# Patient Record
Sex: FEMALE | Race: WHITE | NOT HISPANIC OR LATINO | URBAN - METROPOLITAN AREA
[De-identification: names, ages, dates, MRNs, and addresses within clinical notes are randomized per-mention and may not be internally consistent; named-entity substitution may affect disease eponyms.]

---

## 2020-07-25 ENCOUNTER — TELEPHONE ENCOUNTER (OUTPATIENT)
Dept: URBAN - METROPOLITAN AREA CLINIC 13 | Facility: CLINIC | Age: 80
End: 2020-07-25

## 2020-07-25 RX ORDER — UBIDECARENONE 100 MG
TAKE 1 CAPSULE DAILY CAPSULE ORAL
Refills: 0 | OUTPATIENT
End: 2016-12-06

## 2020-07-25 RX ORDER — ALBUTEROL SULFATE 90 UG/1
INHALE 1 TO 2 PUFFS EVERY 4 TO 6 HOURS AS NEEDED AEROSOL, METERED RESPIRATORY (INHALATION)
Refills: 0 | OUTPATIENT
End: 2016-12-06

## 2020-07-25 RX ORDER — GLIPIZIDE 5 MG/1
TAKE 1 TABLET DAILY TABLET, EXTENDED RELEASE ORAL
Refills: 0 | OUTPATIENT
End: 2020-07-27

## 2020-07-25 RX ORDER — MELATONIN 2.5MG/10ML
TAKE AS DIRECTED LIQUID (ML) ORAL
Refills: 0 | OUTPATIENT
End: 2016-08-18

## 2020-07-25 RX ORDER — B-COMPLEX WITH VITAMIN C
TAKE 1 TABLET DAILY DOSAGE UNKNOWN PER PT TABLET ORAL
Refills: 0 | OUTPATIENT
End: 2020-07-27

## 2020-07-25 RX ORDER — METHYLPREDNISOLONE 4 MG/1
USE AS DIRECTED TABLET ORAL
Refills: 0 | OUTPATIENT
End: 2013-12-05

## 2020-07-25 RX ORDER — TOLTERODINE TARTRATE 4 MG/1
TAKE 1 CAPSULE DAILY CAPSULE, EXTENDED RELEASE ORAL
Refills: 0 | OUTPATIENT
End: 2016-08-18

## 2020-07-25 RX ORDER — AMLODIPINE BESYLATE 5 MG/1
TAKE 1 TABLET DAILY TABLET ORAL
Refills: 0 | OUTPATIENT
End: 2020-07-27

## 2020-07-25 RX ORDER — CHROMIUM 200 MCG
TAKE 1 TABLET DAILY AS DIRECTED TABLET ORAL
Refills: 0 | OUTPATIENT
End: 2016-08-18

## 2020-07-25 RX ORDER — DEXTRIN 3 G/3.5 G
TAKE AS DIRECTED POWDER (GRAM) ORAL
Refills: 0 | OUTPATIENT
End: 2016-08-18

## 2020-07-25 RX ORDER — FESOTERODINE FUMARATE 4 MG/1
TAKE 1 TABLET DAILY TABLET, FILM COATED, EXTENDED RELEASE ORAL
Refills: 0 | OUTPATIENT
End: 2013-12-05

## 2020-07-25 RX ORDER — MULTIVIT-MIN/FA/LYCOPEN/LUTEIN .4-300-25
TAKE 2 TABLET DAILY TABLET ORAL
Refills: 0 | OUTPATIENT
End: 2020-07-27

## 2020-07-25 RX ORDER — B-COMPLEX WITH VITAMIN C
TAKE 1 TABLET DAILY PT STATES UNKNOWN DOSE TABLET ORAL
Refills: 0 | OUTPATIENT
End: 2013-01-17

## 2020-07-25 RX ORDER — MELOXICAM 15 MG/1
TAKE 1 TABLET DAILY TABLET ORAL
Refills: 0 | OUTPATIENT
Start: 2006-12-05 | End: 2009-06-22

## 2020-07-25 RX ORDER — METRONIDAZOLE 250 MG/1
TAKE 1 TABLET 3 TIMES DAILY FOR 10 DAYS TABLET ORAL
Qty: 30 | Refills: 0 | OUTPATIENT
Start: 2012-10-30 | End: 2012-11-29

## 2020-07-25 RX ORDER — BUDESONIDE AND FORMOTEROL FUMARATE DIHYDRATE 160; 4.5 UG/1; UG/1
USE AS DIRECTED AEROSOL RESPIRATORY (INHALATION)
Refills: 0 | OUTPATIENT
End: 2016-12-06

## 2020-07-25 RX ORDER — BIFIDOBACTERIUM LONGUM 10MM CELL
TAKE 1 CAPSULE DAILY CAPSULE ORAL
Refills: 0 | OUTPATIENT
End: 2016-08-18

## 2020-07-25 RX ORDER — HYOSCYAMINE SULFATE 0.12 MG/1
TAKE 1 TABLET SL Q6 HRS PRN ABDOMINAL PAIN TABLET, ORALLY DISINTEGRATING ORAL
Qty: 60 | Refills: 2 | OUTPATIENT
Start: 2016-12-06 | End: 2017-06-06

## 2020-07-25 RX ORDER — POLYETHYLENE GLYCOL 3350, SODIUM CHLORIDE, SODIUM BICARBONATE AND POTASSIUM CHLORIDE WITH LEMON FLAVOR 420; 11.2; 5.72; 1.48 G/4L; G/4L; G/4L; G/4L
USE AS DIRECTED POWDER, FOR SOLUTION ORAL
Qty: 1 | Refills: 0 | OUTPATIENT
Start: 2012-09-11 | End: 2012-10-24

## 2020-07-25 RX ORDER — FESOTERODINE FUMARATE 8 MG/1
TAKE 1 TABLET DAILY TABLET, FILM COATED, EXTENDED RELEASE ORAL
Refills: 0 | OUTPATIENT
End: 2017-10-24

## 2020-07-25 RX ORDER — BIOTIN 2500 MCG
TAKE 1 CAPSULE DAILY 1,000 MG CAPSULE ORAL
Refills: 0 | OUTPATIENT
End: 2020-07-27

## 2020-07-25 RX ORDER — TOLTERODINE TARTRATE 2 MG/1
TAKE 2 TABLET DAILY TABLET, FILM COATED ORAL
Refills: 0 | OUTPATIENT
End: 2018-05-25

## 2020-07-26 ENCOUNTER — TELEPHONE ENCOUNTER (OUTPATIENT)
Dept: URBAN - METROPOLITAN AREA CLINIC 13 | Facility: CLINIC | Age: 80
End: 2020-07-26

## 2020-07-26 RX ORDER — PREDNISONE 20 MG/1
TABLET ORAL
Qty: 12 | Refills: 0 | Status: ACTIVE | COMMUNITY
Start: 2019-12-01

## 2020-07-26 RX ORDER — BLOOD SUGAR DIAGNOSTIC
TEST TWICE A DAY STRIP MISCELLANEOUS
Qty: 50 | Refills: 0 | Status: ACTIVE | COMMUNITY
Start: 2012-09-04

## 2020-07-26 RX ORDER — BUDESONIDE AND FORMOTEROL FUMARATE DIHYDRATE 160; 4.5 UG/1; UG/1
INHALE 2 PUFFS TWICE DAILY. RINSE MOUTH AFTER USE AEROSOL RESPIRATORY (INHALATION)
Refills: 0 | Status: ACTIVE | COMMUNITY

## 2020-07-26 RX ORDER — BENZONATATE 200 MG/1
CAPSULE ORAL
Qty: 30 | Refills: 0 | Status: ACTIVE | COMMUNITY
Start: 2019-12-01

## 2020-07-26 RX ORDER — TRIAMCINOLONE ACETONIDE 1 MG/G
CREAM TOPICAL
Qty: 75 | Refills: 0 | Status: ACTIVE | COMMUNITY
Start: 2020-03-26

## 2020-07-26 RX ORDER — SODIUM CHLORIDE 9 MG/ML
INJECTION, SOLUTION INTRAMUSCULAR; INTRAVENOUS; SUBCUTANEOUS
Qty: 30 | Refills: 0 | Status: ACTIVE | COMMUNITY
Start: 2019-08-01

## 2020-07-26 RX ORDER — EMPAGLIFLOZIN 25 MG/1
TAKE 1 TABLET BY MOUTH ONCE DAILY TABLET, FILM COATED ORAL
Refills: 0 | Status: ACTIVE | COMMUNITY

## 2020-07-26 RX ORDER — MONTELUKAST SODIUM 10 MG/1
TAKE 1 TABLET DAILY TABLET, FILM COATED ORAL
Refills: 0 | Status: ACTIVE | COMMUNITY

## 2020-07-26 RX ORDER — LOSARTAN POTASSIUM 50 MG/1
TAKE 1 TABLET DAILY TABLET, FILM COATED ORAL
Refills: 0 | Status: ACTIVE | COMMUNITY

## 2020-07-26 RX ORDER — EMPAGLIFLOZIN 25 MG/1
TABLET, FILM COATED ORAL
Qty: 90 | Refills: 0 | Status: ACTIVE | COMMUNITY
Start: 2020-07-23

## 2020-07-26 RX ORDER — LEVOTHYROXINE SODIUM 0.1 MG/1
TABLET ORAL
Qty: 90 | Refills: 0 | Status: ACTIVE | COMMUNITY
Start: 2020-01-27

## 2020-07-26 RX ORDER — AZITHROMYCIN DIHYDRATE 250 MG/1
TABLET, FILM COATED ORAL
Qty: 6 | Refills: 0 | Status: ACTIVE | COMMUNITY
Start: 2019-12-01

## 2020-07-26 RX ORDER — OMEPRAZOLE 40 MG/1
TAKE ONE CAPSULE BY MOUTH EVERY DAY CAPSULE, DELAYED RELEASE ORAL
Qty: 90 | Refills: 3 | Status: ACTIVE | COMMUNITY
Start: 2020-07-27

## 2020-07-26 RX ORDER — METHYLPREDNISOLONE 4 MG/1
TABLET ORAL
Qty: 21 | Refills: 0 | Status: ACTIVE | COMMUNITY
Start: 2020-04-02

## 2020-07-26 RX ORDER — SACCHAROMYCES BOULARDII 250 MG
TAKE 1 CAPSULE DAILY CAPSULE ORAL
Refills: 0 | Status: ACTIVE | COMMUNITY

## 2020-07-26 RX ORDER — LEVOTHYROXINE SODIUM 75 UG/1
TAKE 1 TABLET DAILY TABLET ORAL
Refills: 0 | Status: ACTIVE | COMMUNITY

## 2020-07-26 RX ORDER — OMEGA-3/DHA/EPA/FISH OIL 300-1000MG
TAKE 1 TABLET DAILY CAPSULE ORAL
Refills: 0 | Status: ACTIVE | COMMUNITY

## 2020-07-26 RX ORDER — KETOCONAZOLE 20 MG/G
CREAM TOPICAL
Qty: 60 | Refills: 0 | Status: ACTIVE | COMMUNITY
Start: 2020-03-26

## 2020-07-26 RX ORDER — EZETIMIBE 10 MG/1
TAKE 1 TABLET DAILY TABLET ORAL
Refills: 0 | Status: ACTIVE | COMMUNITY

## 2020-07-26 RX ORDER — ALBUTEROL SULFATE 90 UG/1
INHALE 1 PUFF EVERY 4 HOURS AS NEEDED AEROSOL, METERED RESPIRATORY (INHALATION)
Refills: 0 | Status: ACTIVE | COMMUNITY

## 2020-07-26 RX ORDER — AMOXICILLIN 500 MG/1
CAPSULE ORAL
Qty: 20 | Refills: 0 | Status: ACTIVE | COMMUNITY
Start: 2019-12-30

## 2020-07-27 ENCOUNTER — OFFICE VISIT (OUTPATIENT)
Dept: URBAN - METROPOLITAN AREA CLINIC 113 | Facility: CLINIC | Age: 80
End: 2020-07-27

## 2020-08-14 ENCOUNTER — WEB ENCOUNTER (OUTPATIENT)
Dept: URBAN - METROPOLITAN AREA CLINIC 113 | Facility: CLINIC | Age: 80
End: 2020-08-14

## 2020-08-17 ENCOUNTER — CLAIMS CREATED FROM THE CLAIM WINDOW (OUTPATIENT)
Dept: URBAN - METROPOLITAN AREA CLINIC 4 | Facility: CLINIC | Age: 80
End: 2020-08-17
Payer: MEDICARE

## 2020-08-17 ENCOUNTER — OFFICE VISIT (OUTPATIENT)
Dept: URBAN - METROPOLITAN AREA SURGERY CENTER 25 | Facility: SURGERY CENTER | Age: 80
End: 2020-08-17
Payer: MEDICARE

## 2020-08-17 DIAGNOSIS — K29.60 OTHER GASTRITIS WITHOUT BLEEDING: ICD-10-CM

## 2020-08-17 DIAGNOSIS — K44.9 HIATAL HERNIA: ICD-10-CM

## 2020-08-17 DIAGNOSIS — K21.0 GASTRO-ESOPHAGEAL REFLUX DISEASE WITH ESOPHAGITIS: ICD-10-CM

## 2020-08-17 DIAGNOSIS — K21.0 ESOPHAGITIS, REFLUX: ICD-10-CM

## 2020-08-17 PROCEDURE — 88312 SPECIAL STAINS GROUP 1: CPT | Performed by: PATHOLOGY

## 2020-08-17 PROCEDURE — 43239 EGD BIOPSY SINGLE/MULTIPLE: CPT | Performed by: INTERNAL MEDICINE

## 2020-08-17 PROCEDURE — 88305 TISSUE EXAM BY PATHOLOGIST: CPT | Performed by: PATHOLOGY

## 2020-08-17 PROCEDURE — 88313 SPECIAL STAINS GROUP 2: CPT | Performed by: PATHOLOGY

## 2020-10-27 ENCOUNTER — WEB ENCOUNTER (OUTPATIENT)
Dept: URBAN - METROPOLITAN AREA CLINIC 113 | Facility: CLINIC | Age: 80
End: 2020-10-27

## 2020-10-27 ENCOUNTER — OFFICE VISIT (OUTPATIENT)
Dept: URBAN - METROPOLITAN AREA CLINIC 113 | Facility: CLINIC | Age: 80
End: 2020-10-27
Payer: MEDICARE

## 2020-10-27 VITALS
BODY MASS INDEX: 31.53 KG/M2 | DIASTOLIC BLOOD PRESSURE: 76 MMHG | HEIGHT: 61 IN | TEMPERATURE: 96 F | HEART RATE: 69 BPM | SYSTOLIC BLOOD PRESSURE: 125 MMHG | WEIGHT: 167 LBS | RESPIRATION RATE: 18 BRPM

## 2020-10-27 DIAGNOSIS — K21.9 GASTROESOPHAGEAL REFLUX DISEASE, UNSPECIFIED WHETHER ESOPHAGITIS PRESENT: ICD-10-CM

## 2020-10-27 DIAGNOSIS — Z80.0 FAMILY HISTORY OF COLON CANCER IN FATHER: ICD-10-CM

## 2020-10-27 PROBLEM — 312824007: Status: ACTIVE | Noted: 2020-10-27

## 2020-10-27 PROCEDURE — G8427 DOCREV CUR MEDS BY ELIG CLIN: HCPCS | Performed by: NURSE PRACTITIONER

## 2020-10-27 PROCEDURE — 99213 OFFICE O/P EST LOW 20 MIN: CPT | Performed by: NURSE PRACTITIONER

## 2020-10-27 PROCEDURE — G8482 FLU IMMUNIZE ORDER/ADMIN: HCPCS | Performed by: NURSE PRACTITIONER

## 2020-10-27 RX ORDER — LORATADINE 10 MG/1
1 TABLET TABLET ORAL ONCE A DAY
Status: ACTIVE | COMMUNITY

## 2020-10-27 RX ORDER — MONTELUKAST SODIUM 10 MG/1
TAKE 1 TABLET DAILY TABLET, FILM COATED ORAL
Refills: 0 | Status: ACTIVE | COMMUNITY

## 2020-10-27 RX ORDER — LOSARTAN POTASSIUM 50 MG/1
TAKE 1 TABLET DAILY TABLET, FILM COATED ORAL
Refills: 0 | Status: ACTIVE | COMMUNITY

## 2020-10-27 RX ORDER — LORATADINE 10 MG
1 PACKET MIXED WITH 8 OUNCES OF FLUID TABLET,DISINTEGRATING ORAL ONCE A DAY
Status: ACTIVE | COMMUNITY

## 2020-10-27 RX ORDER — SACCHAROMYCES BOULARDII 250 MG
TAKE 1 CAPSULE DAILY CAPSULE ORAL
Refills: 0 | Status: ACTIVE | COMMUNITY

## 2020-10-27 RX ORDER — ASCORBIC ACID 1000 MG
AS DIRECTED TABLET ORAL
Status: ACTIVE | COMMUNITY

## 2020-10-27 RX ORDER — EZETIMIBE 10 MG/1
TAKE 1 TABLET DAILY TABLET ORAL
Refills: 0 | Status: ACTIVE | COMMUNITY

## 2020-10-27 RX ORDER — AMLODIPINE BESYLATE 5 MG/1
1 TABLET TABLET ORAL ONCE A DAY
Status: ACTIVE | COMMUNITY

## 2020-10-27 RX ORDER — EMPAGLIFLOZIN 25 MG/1
1 TABLET TABLET, FILM COATED ORAL ONCE A DAY
Status: ACTIVE | COMMUNITY

## 2020-10-27 RX ORDER — LEVOTHYROXINE SODIUM 0.1 MG/1
TABLET ORAL
Qty: 90 | Refills: 0 | Status: ACTIVE | COMMUNITY
Start: 2020-01-27

## 2020-10-27 RX ORDER — OMEGA-3/DHA/EPA/FISH OIL 300-1000MG
TAKE 1 TABLET DAILY CAPSULE ORAL
Refills: 0 | Status: ACTIVE | COMMUNITY

## 2020-10-27 RX ORDER — EMPAGLIFLOZIN 25 MG/1
TAKE 1 TABLET BY MOUTH ONCE DAILY TABLET, FILM COATED ORAL
Refills: 0 | Status: ACTIVE | COMMUNITY

## 2020-10-27 RX ORDER — OMEPRAZOLE 40 MG/1
TAKE ONE CAPSULE BY MOUTH EVERY DAY CAPSULE, DELAYED RELEASE ORAL
Qty: 90 | Refills: 3 | Status: ACTIVE | COMMUNITY
Start: 2020-07-27

## 2021-09-09 NOTE — HPI-TODAY'S VISIT:
Please advise--  Patient calling asking for direction. Please see yesterday's virtual visit with gayle ruiz. 2 previous neg covid tests. Pt saw his cxr was negative. He has been very sick and lost 10lb in the past week. He is wondering what now, since neg cxr.     He would like a call back [phone]   This is a 79-year-old female with a history of GERD, irregular bowel habits associated with a functional bowel disorder, and a paternal history of colon cancer (age 62) due screening in October 2022 presenting for follow-up.  She was last seen 7/27/2020 for follow-up regarding GERD.  She reported worsening acid reflux symptoms.  She was instructed to begin omeprazole 40 mg daily.  She was scheduled for an EGD.  EGD 8/17/2020: Irregular Z-line status post biopsy, small hiatal hernia, chronic gastritis status post biopsy, normal examined duodenum.  Antral biopsy demonstrated chemical/reactive gastropathy without evidence of H. pylori or intestinal metaplasia or dysplasia.  GE junction biopsy demonstrated gastric type mucosa with reflux type changes; no squamous mucosa identified.  No evidence of Taylor's or eosinophilic esophagitis. She is taking omeprazole 20 mg twice a day.  Occasionally, she will not take the second dose.  She is asymptomatic on this regimen.  She denies difficulty swallowing, heartburn, abdominal pain, or nausea.  Bowel habits are controlled with daily fiber.  She denies constipation or diarrhea.  She denies any other abdominal symptoms.  She reports wheezing after the EGD for which she required a nebulizer treatment.  This resolved after taking inhalers for 2 days.

## 2023-06-07 ENCOUNTER — OFFICE VISIT (OUTPATIENT)
Dept: URBAN - METROPOLITAN AREA CLINIC 113 | Facility: CLINIC | Age: 83
End: 2023-06-07
Payer: MEDICARE

## 2023-06-07 VITALS
BODY MASS INDEX: 30.58 KG/M2 | SYSTOLIC BLOOD PRESSURE: 164 MMHG | RESPIRATION RATE: 18 BRPM | HEIGHT: 61 IN | DIASTOLIC BLOOD PRESSURE: 88 MMHG | HEART RATE: 62 BPM | WEIGHT: 162 LBS | TEMPERATURE: 97.3 F

## 2023-06-07 DIAGNOSIS — L29.9 PRURITUS: ICD-10-CM

## 2023-06-07 DIAGNOSIS — K59.09 CHRONIC CONSTIPATION: ICD-10-CM

## 2023-06-07 DIAGNOSIS — K21.9 GERD WITHOUT ESOPHAGITIS: ICD-10-CM

## 2023-06-07 PROCEDURE — 99214 OFFICE O/P EST MOD 30 MIN: CPT | Performed by: NURSE PRACTITIONER

## 2023-06-07 RX ORDER — VIBEGRON 75 MG/1
1 TABLET TABLET, FILM COATED ORAL ONCE A DAY
Status: ACTIVE | COMMUNITY

## 2023-06-07 RX ORDER — AMLODIPINE BESYLATE 5 MG/1
1 TABLET TABLET ORAL ONCE A DAY
Status: ACTIVE | COMMUNITY

## 2023-06-07 RX ORDER — OMEPRAZOLE 40 MG/1
TAKE ONE CAPSULE BY MOUTH EVERY DAY CAPSULE, DELAYED RELEASE ORAL
Qty: 90 | Refills: 3 | Status: ACTIVE | COMMUNITY
Start: 2020-07-27

## 2023-06-07 RX ORDER — EMPAGLIFLOZIN 25 MG/1
1 TABLET TABLET, FILM COATED ORAL ONCE A DAY
Status: ACTIVE | COMMUNITY

## 2023-06-07 RX ORDER — LOSARTAN POTASSIUM 50 MG/1
TAKE 1 TABLET DAILY TABLET, FILM COATED ORAL
Refills: 0 | Status: ACTIVE | COMMUNITY

## 2023-06-07 RX ORDER — LORATADINE 10 MG
1 PACKET MIXED WITH 8 OUNCES OF FLUID TABLET,DISINTEGRATING ORAL ONCE A DAY
Status: ON HOLD | COMMUNITY

## 2023-06-07 RX ORDER — EZETIMIBE 10 MG/1
TAKE 1 TABLET DAILY TABLET ORAL
Refills: 0 | Status: ACTIVE | COMMUNITY

## 2023-06-07 RX ORDER — MELOXICAM 7.5 MG
1 TABLET TABLET ORAL ONCE A DAY
Status: ACTIVE | COMMUNITY

## 2023-06-07 RX ORDER — ASCORBIC ACID 1000 MG
AS DIRECTED TABLET ORAL
Status: ON HOLD | COMMUNITY

## 2023-06-07 RX ORDER — SACCHAROMYCES BOULARDII 250 MG
TAKE 1 CAPSULE DAILY CAPSULE ORAL
Refills: 0 | Status: ACTIVE | COMMUNITY

## 2023-06-07 RX ORDER — LORATADINE 10 MG/1
1 TABLET TABLET ORAL ONCE A DAY
Status: ACTIVE | COMMUNITY

## 2023-06-07 RX ORDER — THIAMINE MONONITRATE (VIT B1) 100 MG
1 TABLET TABLET ORAL ONCE A DAY
Status: ACTIVE | COMMUNITY

## 2023-06-07 RX ORDER — OMEGA-3/DHA/EPA/FISH OIL 300-1000MG
TAKE 1 TABLET DAILY CAPSULE ORAL
Refills: 0 | Status: ON HOLD | COMMUNITY

## 2023-06-07 RX ORDER — EMPAGLIFLOZIN 25 MG/1
TAKE 1 TABLET BY MOUTH ONCE DAILY TABLET, FILM COATED ORAL
Refills: 0 | Status: ON HOLD | COMMUNITY

## 2023-06-07 RX ORDER — VITAMIN E (DL,TOCOPHERYL ACET) 180 MG
1 CAPSULE CAPSULE ORAL ONCE A DAY
Status: ACTIVE | COMMUNITY

## 2023-06-07 RX ORDER — LEVOTHYROXINE SODIUM 0.1 MG/1
TABLET ORAL
Qty: 90 | Refills: 0 | Status: ACTIVE | COMMUNITY
Start: 2020-01-27

## 2023-06-07 RX ORDER — DULAGLUTIDE 0.75 MG/.5ML
AS DIRECTED INJECTION, SOLUTION SUBCUTANEOUS
Status: ACTIVE | COMMUNITY

## 2023-06-07 RX ORDER — MONTELUKAST SODIUM 10 MG/1
TAKE 1 TABLET DAILY TABLET, FILM COATED ORAL
Refills: 0 | Status: ACTIVE | COMMUNITY

## 2023-06-07 NOTE — HPI-TODAY'S VISIT:
This is an 82-year-old female with a history of GERD, irregular bowel habits associated with a functional bowel disorder, and a paternal history of colon cancer (age 62) due screening in October 2022 presenting after a long interval for evaluation of fecal incontinence and rectal pain.. She was last seen in the office 10/27/2020.  She was taking omeprazole 20 mg twice a day.  Acid reflux is well controlled.  She recently undergone an EGD notable for chronic gastritis and reflux associated changes in the lower esophagus without Taylor's.  She was to continue current therapy.  She was reminded that colon cancer screening was due in 2022.  She did not return for follow-up. In mid April, she experienced 5 or 6 bowel movements per day.  Her stools were "mushy and compressed."  Her primary care provider advised that she stop magnesium and Imodium.  She has now returned to a constipation predominant bowel pattern.  She has 4 or 5 bowel movements per week.  Her stools are intermittently large and require straining.  She has tried daily MiraLAX and reports it is ineffective.  She is taking 2 fiber Gummies daily.  Once every week or 2, she reports red blood on the tissue, particularly after a large stool.  She admits proctalgia associated with large bowel movements.  This last occurred 4 days ago.  She denies abdominal pain.  GERD is controlled with omeprazole 40 mg daily.  She denies other abdominal symptoms. She had GYN follow-up last week.  A rectal exam was performed.  Hemoccult test was negative.

## 2023-06-10 PROBLEM — 266435005: Status: ACTIVE | Noted: 2023-06-10

## 2023-09-07 ENCOUNTER — DASHBOARD ENCOUNTERS (OUTPATIENT)
Age: 83
End: 2023-09-07

## 2023-09-07 ENCOUNTER — OFFICE VISIT (OUTPATIENT)
Dept: URBAN - METROPOLITAN AREA CLINIC 113 | Facility: CLINIC | Age: 83
End: 2023-09-07
Payer: MEDICARE

## 2023-09-07 ENCOUNTER — LAB OUTSIDE AN ENCOUNTER (OUTPATIENT)
Dept: URBAN - METROPOLITAN AREA CLINIC 113 | Facility: CLINIC | Age: 83
End: 2023-09-07

## 2023-09-07 VITALS
WEIGHT: 159 LBS | TEMPERATURE: 97.4 F | SYSTOLIC BLOOD PRESSURE: 157 MMHG | HEIGHT: 61 IN | BODY MASS INDEX: 30.02 KG/M2 | DIASTOLIC BLOOD PRESSURE: 92 MMHG | HEART RATE: 65 BPM | RESPIRATION RATE: 20 BRPM

## 2023-09-07 DIAGNOSIS — K21.9 GERD WITHOUT ESOPHAGITIS: ICD-10-CM

## 2023-09-07 DIAGNOSIS — Z80.0 FAMILY HISTORY OF COLON CANCER IN FATHER: ICD-10-CM

## 2023-09-07 DIAGNOSIS — K59.09 CHRONIC CONSTIPATION: ICD-10-CM

## 2023-09-07 PROBLEM — 236069009: Status: ACTIVE | Noted: 2023-09-07

## 2023-09-07 PROCEDURE — 99213 OFFICE O/P EST LOW 20 MIN: CPT | Performed by: NURSE PRACTITIONER

## 2023-09-07 RX ORDER — LOSARTAN POTASSIUM 50 MG/1
TAKE 1 TABLET DAILY TABLET, FILM COATED ORAL
Refills: 0 | Status: ACTIVE | COMMUNITY

## 2023-09-07 RX ORDER — DULAGLUTIDE 0.75 MG/.5ML
AS DIRECTED INJECTION, SOLUTION SUBCUTANEOUS
Status: ACTIVE | COMMUNITY

## 2023-09-07 RX ORDER — MELOXICAM 7.5 MG
1 TABLET TABLET ORAL ONCE A DAY
Status: ON HOLD | COMMUNITY

## 2023-09-07 RX ORDER — LORATADINE 10 MG/1
1 TABLET TABLET ORAL ONCE A DAY
Status: ACTIVE | COMMUNITY

## 2023-09-07 RX ORDER — MONTELUKAST SODIUM 10 MG/1
TAKE 1 TABLET DAILY TABLET, FILM COATED ORAL
Refills: 0 | Status: ACTIVE | COMMUNITY

## 2023-09-07 RX ORDER — ONDANSETRON 4 MG/1
1 TABLET TABLET, ORALLY DISINTEGRATING ORAL
Qty: 6 | Refills: 0 | OUTPATIENT
Start: 2023-09-07

## 2023-09-07 RX ORDER — EZETIMIBE 10 MG/1
TAKE 1 TABLET DAILY TABLET ORAL
Refills: 0 | Status: ACTIVE | COMMUNITY

## 2023-09-07 RX ORDER — LEVOTHYROXINE SODIUM 0.1 MG/1
TABLET ORAL
Qty: 90 | Refills: 0 | Status: ON HOLD | COMMUNITY
Start: 2020-01-27

## 2023-09-07 RX ORDER — THIAMINE MONONITRATE (VIT B1) 100 MG
1 TABLET TABLET ORAL ONCE A DAY
Status: ACTIVE | COMMUNITY

## 2023-09-07 RX ORDER — SACCHAROMYCES BOULARDII 250 MG
AS DIRECTED CAPSULE ORAL
Status: ACTIVE | COMMUNITY

## 2023-09-07 RX ORDER — EMPAGLIFLOZIN 10 MG/1
TAKE 1 TABLET BY MOUTH ONCE DAILY TABLET, FILM COATED ORAL
Refills: 0 | Status: ACTIVE | COMMUNITY

## 2023-09-07 RX ORDER — VITAMIN E (DL,TOCOPHERYL ACET) 180 MG
1 CAPSULE CAPSULE ORAL ONCE A DAY
Status: ACTIVE | COMMUNITY

## 2023-09-07 RX ORDER — ASCORBIC ACID 1000 MG
AS DIRECTED TABLET ORAL
Status: ON HOLD | COMMUNITY

## 2023-09-07 RX ORDER — OMEPRAZOLE 40 MG/1
TAKE ONE CAPSULE BY MOUTH EVERY DAY CAPSULE, DELAYED RELEASE ORAL
Qty: 90 | Refills: 3 | Status: ACTIVE | COMMUNITY
Start: 2020-07-27

## 2023-09-07 RX ORDER — ANTIARTHRITIC COMBINATION NO.2 900 MG
1 CAPSULE TABLET ORAL ONCE A DAY
Status: ACTIVE | COMMUNITY

## 2023-09-07 RX ORDER — EMPAGLIFLOZIN 25 MG/1
1 TABLET TABLET, FILM COATED ORAL ONCE A DAY
Status: ON HOLD | COMMUNITY

## 2023-09-07 RX ORDER — SACCHAROMYCES BOULARDII 250 MG
TAKE 1 CAPSULE DAILY CAPSULE ORAL
Refills: 0 | Status: ON HOLD | COMMUNITY

## 2023-09-07 RX ORDER — AMLODIPINE BESYLATE 5 MG/1
1/2 TABLETS TABLET ORAL ONCE A DAY
Status: ON HOLD | COMMUNITY

## 2023-09-07 RX ORDER — LORATADINE 10 MG
1 PACKET MIXED WITH 8 OUNCES OF FLUID TABLET,DISINTEGRATING ORAL ONCE A DAY
Status: ON HOLD | COMMUNITY

## 2023-09-07 RX ORDER — OMEGA-3/DHA/EPA/FISH OIL 300-1000MG
TAKE 1 TABLET DAILY CAPSULE ORAL
Refills: 0 | Status: ON HOLD | COMMUNITY

## 2023-09-07 RX ORDER — VIBEGRON 75 MG/1
1 TABLET TABLET, FILM COATED ORAL ONCE A DAY
Status: ACTIVE | COMMUNITY

## 2023-09-07 NOTE — HPI-TODAY'S VISIT:
This is an 82-year-old female with a history of GERD, irregular bowel habits associated with a functional bowel disorder, paternal history of colon cancer (age 62) due screening in October 2022, fecal incontinence, and rectal pain presenting for follow-up. She was last seen 6/7/2023.  GERD was controlled with omeprazole 40 mg daily.  She reported chronic constipation.  This was suboptimally managed on daily fiber.  She had occasional small-volume rectal bleeding if stools were large, likely associated with hemorrhoids or tear.  She had tried MiraLAX in the past which was ineffective.  She was using milk of magnesia as needed and reported that it was working.  She was to increase fiber to 5 to 6 g daily and start milk of magnesia 1 tablespoon daily to be titrated to effect.  She had itching in the gluteal cleft.  There was mild erythema.  It was discussed this may be associated with moisture or early yeast infection.  She was to use cornstarch or Lotrisone over-the-counter. She reports that she has been doing well.  She is taking omeprazole 40 mg daily.  She has occasional postprandial, generalized cramps that last a few minutes.  Occasionally, this precedes a bowel movement and is relieved after a stool.  This typically occurs only when she overeats at a restaurant.  She is having 1 or 2 bowel movements per day.  Her stools are formed.  She denies straining.  She is currently off fiber.  She has infrequent episodes of small-volume red blood per rectum associated with hemorrhoids.  She denies other abdominal symptoms. She is ready to schedule a colonoscopy.  She had difficulty with vomiting associated with bowel preparation prior to her last exam.

## 2023-11-16 ENCOUNTER — TELEPHONE ENCOUNTER (OUTPATIENT)
Dept: URBAN - METROPOLITAN AREA CLINIC 113 | Facility: CLINIC | Age: 83
End: 2023-11-16

## 2023-11-29 ENCOUNTER — OFFICE VISIT (OUTPATIENT)
Dept: URBAN - METROPOLITAN AREA SURGERY CENTER 25 | Facility: SURGERY CENTER | Age: 83
End: 2023-11-29

## 2023-12-19 ENCOUNTER — OFFICE VISIT (OUTPATIENT)
Dept: URBAN - METROPOLITAN AREA CLINIC 113 | Facility: CLINIC | Age: 83
End: 2023-12-19

## 2024-01-31 ENCOUNTER — TELEPHONE ENCOUNTER (OUTPATIENT)
Dept: URBAN - METROPOLITAN AREA CLINIC 113 | Facility: CLINIC | Age: 84
End: 2024-01-31

## 2024-02-02 ENCOUNTER — COLON (OUTPATIENT)
Dept: URBAN - METROPOLITAN AREA SURGERY CENTER 25 | Facility: SURGERY CENTER | Age: 84
End: 2024-02-02
Payer: MEDICARE

## 2024-02-02 DIAGNOSIS — Z12.11 COLON CANCER SCREENING: ICD-10-CM

## 2024-02-02 DIAGNOSIS — Z80.0 FAMILY HISTORY OF COLON CANCER: ICD-10-CM

## 2024-02-02 PROCEDURE — G0105 COLORECTAL SCRN; HI RISK IND: HCPCS | Performed by: INTERNAL MEDICINE

## 2024-02-02 RX ORDER — VIBEGRON 75 MG/1
1 TABLET TABLET, FILM COATED ORAL ONCE A DAY
Status: ACTIVE | COMMUNITY

## 2024-02-02 RX ORDER — MONTELUKAST SODIUM 10 MG/1
TAKE 1 TABLET DAILY TABLET, FILM COATED ORAL
Refills: 0 | Status: ACTIVE | COMMUNITY

## 2024-02-02 RX ORDER — THIAMINE MONONITRATE (VIT B1) 100 MG
1 TABLET TABLET ORAL ONCE A DAY
Status: ACTIVE | COMMUNITY

## 2024-02-02 RX ORDER — DULAGLUTIDE 0.75 MG/.5ML
AS DIRECTED INJECTION, SOLUTION SUBCUTANEOUS
Status: ACTIVE | COMMUNITY

## 2024-02-02 RX ORDER — EMPAGLIFLOZIN 10 MG/1
TAKE 1 TABLET BY MOUTH ONCE DAILY TABLET, FILM COATED ORAL
Refills: 0 | Status: ACTIVE | COMMUNITY

## 2024-02-02 RX ORDER — ASCORBIC ACID 1000 MG
AS DIRECTED TABLET ORAL
Status: ON HOLD | COMMUNITY

## 2024-02-02 RX ORDER — SACCHAROMYCES BOULARDII 250 MG
AS DIRECTED CAPSULE ORAL
Status: ACTIVE | COMMUNITY

## 2024-02-02 RX ORDER — LOSARTAN POTASSIUM 50 MG/1
TAKE 1 TABLET DAILY TABLET, FILM COATED ORAL
Refills: 0 | Status: ACTIVE | COMMUNITY

## 2024-02-02 RX ORDER — EZETIMIBE 10 MG/1
TAKE 1 TABLET DAILY TABLET ORAL
Refills: 0 | Status: ACTIVE | COMMUNITY

## 2024-02-02 RX ORDER — OMEPRAZOLE 40 MG/1
TAKE ONE CAPSULE BY MOUTH EVERY DAY CAPSULE, DELAYED RELEASE ORAL
Qty: 90 | Refills: 3 | Status: ACTIVE | COMMUNITY
Start: 2020-07-27

## 2024-02-02 RX ORDER — AMLODIPINE BESYLATE 5 MG/1
1/2 TABLETS TABLET ORAL ONCE A DAY
Status: ON HOLD | COMMUNITY

## 2024-02-02 RX ORDER — MELOXICAM 7.5 MG
1 TABLET TABLET ORAL ONCE A DAY
Status: ON HOLD | COMMUNITY

## 2024-02-02 RX ORDER — EMPAGLIFLOZIN 25 MG/1
1 TABLET TABLET, FILM COATED ORAL ONCE A DAY
Status: ON HOLD | COMMUNITY

## 2024-02-02 RX ORDER — LORATADINE 10 MG
1 PACKET MIXED WITH 8 OUNCES OF FLUID TABLET,DISINTEGRATING ORAL ONCE A DAY
Status: ON HOLD | COMMUNITY

## 2024-02-02 RX ORDER — LORATADINE 10 MG/1
1 TABLET TABLET ORAL ONCE A DAY
Status: ACTIVE | COMMUNITY

## 2024-02-02 RX ORDER — OMEGA-3/DHA/EPA/FISH OIL 300-1000MG
TAKE 1 TABLET DAILY CAPSULE ORAL
Refills: 0 | Status: ON HOLD | COMMUNITY

## 2024-02-02 RX ORDER — ONDANSETRON 4 MG/1
1 TABLET TABLET, ORALLY DISINTEGRATING ORAL
Qty: 6 | Refills: 0 | Status: ACTIVE | COMMUNITY
Start: 2023-09-07

## 2024-02-02 RX ORDER — SACCHAROMYCES BOULARDII 250 MG
TAKE 1 CAPSULE DAILY CAPSULE ORAL
Refills: 0 | Status: ON HOLD | COMMUNITY

## 2024-02-02 RX ORDER — VITAMIN E (DL,TOCOPHERYL ACET) 180 MG
1 CAPSULE CAPSULE ORAL ONCE A DAY
Status: ACTIVE | COMMUNITY

## 2024-02-02 RX ORDER — ANTIARTHRITIC COMBINATION NO.2 900 MG
1 CAPSULE TABLET ORAL ONCE A DAY
Status: ACTIVE | COMMUNITY

## 2024-02-02 RX ORDER — LEVOTHYROXINE SODIUM 0.1 MG/1
TABLET ORAL
Qty: 90 | Refills: 0 | Status: ON HOLD | COMMUNITY
Start: 2020-01-27

## 2024-04-03 ENCOUNTER — LAB (OUTPATIENT)
Dept: URBAN - METROPOLITAN AREA CLINIC 113 | Facility: CLINIC | Age: 84
End: 2024-04-03

## 2024-04-03 ENCOUNTER — COLON (OUTPATIENT)
Dept: URBAN - METROPOLITAN AREA SURGERY CENTER 25 | Facility: SURGERY CENTER | Age: 84
End: 2024-04-03
Payer: MEDICARE

## 2024-04-03 DIAGNOSIS — Z12.11 ENCOUNTER FOR SCREENING FOR MALIGNANT NEOPLASM OF COLON: ICD-10-CM

## 2024-04-03 DIAGNOSIS — Z80.0 FAMILY HISTORY OF CANCER OF GI TRACT: ICD-10-CM

## 2024-04-03 PROCEDURE — G0105 COLORECTAL SCRN; HI RISK IND: HCPCS | Performed by: INTERNAL MEDICINE

## 2024-04-03 RX ORDER — OMEPRAZOLE 40 MG/1
TAKE ONE CAPSULE BY MOUTH EVERY DAY CAPSULE, DELAYED RELEASE ORAL
Qty: 90 | Refills: 3 | Status: ACTIVE | COMMUNITY
Start: 2020-07-27

## 2024-04-03 RX ORDER — AMLODIPINE BESYLATE 5 MG/1
1/2 TABLETS TABLET ORAL ONCE A DAY
Status: ON HOLD | COMMUNITY

## 2024-04-03 RX ORDER — LORATADINE 10 MG/1
1 TABLET TABLET ORAL ONCE A DAY
Status: ACTIVE | COMMUNITY

## 2024-04-03 RX ORDER — EMPAGLIFLOZIN 10 MG/1
TAKE 1 TABLET BY MOUTH ONCE DAILY TABLET, FILM COATED ORAL
Refills: 0 | Status: ACTIVE | COMMUNITY

## 2024-04-03 RX ORDER — ANTIARTHRITIC COMBINATION NO.2 900 MG
1 CAPSULE TABLET ORAL ONCE A DAY
Status: ACTIVE | COMMUNITY

## 2024-04-03 RX ORDER — MONTELUKAST SODIUM 10 MG/1
TAKE 1 TABLET DAILY TABLET, FILM COATED ORAL
Refills: 0 | Status: ACTIVE | COMMUNITY

## 2024-04-03 RX ORDER — VITAMIN E (DL,TOCOPHERYL ACET) 180 MG
1 CAPSULE CAPSULE ORAL ONCE A DAY
Status: ACTIVE | COMMUNITY

## 2024-04-03 RX ORDER — LEVOTHYROXINE SODIUM 0.1 MG/1
TABLET ORAL
Qty: 90 | Refills: 0 | Status: ON HOLD | COMMUNITY
Start: 2020-01-27

## 2024-04-03 RX ORDER — THIAMINE MONONITRATE (VIT B1) 100 MG
1 TABLET TABLET ORAL ONCE A DAY
Status: ACTIVE | COMMUNITY

## 2024-04-03 RX ORDER — ONDANSETRON 4 MG/1
1 TABLET TABLET, ORALLY DISINTEGRATING ORAL
Qty: 6 | Refills: 0 | Status: ACTIVE | COMMUNITY
Start: 2023-09-07

## 2024-04-03 RX ORDER — POLYETHYLENE GLYCOL 3350, SODIUM SULFATE, SODIUM CHLORIDE, POTASSIUM CHLORIDE, ASCORBIC ACID, SODIUM ASCORBATE 140-9-5.2G
750 ML KIT ORAL ONCE
Qty: 30 | Refills: 1 | Status: ACTIVE | COMMUNITY
Start: 2024-03-07 | End: 2024-05-06

## 2024-04-03 RX ORDER — LOSARTAN POTASSIUM 50 MG/1
TAKE 1 TABLET DAILY TABLET, FILM COATED ORAL
Refills: 0 | Status: ACTIVE | COMMUNITY

## 2024-04-03 RX ORDER — LORATADINE 10 MG
1 PACKET MIXED WITH 8 OUNCES OF FLUID TABLET,DISINTEGRATING ORAL ONCE A DAY
Status: ON HOLD | COMMUNITY

## 2024-04-03 RX ORDER — SACCHAROMYCES BOULARDII 250 MG
AS DIRECTED CAPSULE ORAL
Status: ACTIVE | COMMUNITY

## 2024-04-03 RX ORDER — VIBEGRON 75 MG/1
1 TABLET TABLET, FILM COATED ORAL ONCE A DAY
Status: ACTIVE | COMMUNITY

## 2024-04-03 RX ORDER — SACCHAROMYCES BOULARDII 250 MG
TAKE 1 CAPSULE DAILY CAPSULE ORAL
Refills: 0 | Status: ON HOLD | COMMUNITY

## 2024-04-03 RX ORDER — ASCORBIC ACID 1000 MG
AS DIRECTED TABLET ORAL
Status: ON HOLD | COMMUNITY

## 2024-04-03 RX ORDER — EZETIMIBE 10 MG/1
TAKE 1 TABLET DAILY TABLET ORAL
Refills: 0 | Status: ACTIVE | COMMUNITY

## 2024-04-03 RX ORDER — DULAGLUTIDE 0.75 MG/.5ML
AS DIRECTED INJECTION, SOLUTION SUBCUTANEOUS
Status: ACTIVE | COMMUNITY

## 2024-04-03 RX ORDER — EMPAGLIFLOZIN 25 MG/1
1 TABLET TABLET, FILM COATED ORAL ONCE A DAY
Status: ON HOLD | COMMUNITY

## 2024-04-03 RX ORDER — OMEGA-3/DHA/EPA/FISH OIL 300-1000MG
TAKE 1 TABLET DAILY CAPSULE ORAL
Refills: 0 | Status: ON HOLD | COMMUNITY

## 2024-04-03 RX ORDER — MELOXICAM 7.5 MG
1 TABLET TABLET ORAL ONCE A DAY
Status: ON HOLD | COMMUNITY

## 2024-06-06 ENCOUNTER — OFFICE VISIT (OUTPATIENT)
Dept: URBAN - METROPOLITAN AREA CLINIC 107 | Facility: CLINIC | Age: 84
End: 2024-06-06
Payer: MEDICARE

## 2024-06-06 ENCOUNTER — LAB OUTSIDE AN ENCOUNTER (OUTPATIENT)
Dept: URBAN - METROPOLITAN AREA CLINIC 107 | Facility: CLINIC | Age: 84
End: 2024-06-06

## 2024-06-06 VITALS
HEART RATE: 82 BPM | TEMPERATURE: 98.1 F | DIASTOLIC BLOOD PRESSURE: 65 MMHG | HEIGHT: 61 IN | WEIGHT: 153 LBS | SYSTOLIC BLOOD PRESSURE: 94 MMHG | BODY MASS INDEX: 28.89 KG/M2

## 2024-06-06 DIAGNOSIS — Z80.0 FAMILY HISTORY OF COLON CANCER IN FATHER: ICD-10-CM

## 2024-06-06 DIAGNOSIS — K44.9 HIATAL HERNIA: ICD-10-CM

## 2024-06-06 DIAGNOSIS — K21.00 GASTROESOPHAGEAL REFLUX DISEASE WITH ESOPHAGITIS WITHOUT HEMORRHAGE: ICD-10-CM

## 2024-06-06 DIAGNOSIS — K59.09 CHRONIC CONSTIPATION: ICD-10-CM

## 2024-06-06 PROBLEM — 266433003: Status: ACTIVE | Noted: 2024-06-06

## 2024-06-06 PROCEDURE — 99214 OFFICE O/P EST MOD 30 MIN: CPT | Performed by: INTERNAL MEDICINE

## 2024-06-06 RX ORDER — AMLODIPINE BESYLATE 5 MG/1
1/2 TABLETS TABLET ORAL ONCE A DAY
Status: ON HOLD | COMMUNITY

## 2024-06-06 RX ORDER — LORATADINE 10 MG
1 PACKET MIXED WITH 8 OUNCES OF FLUID TABLET,DISINTEGRATING ORAL ONCE A DAY
Status: ON HOLD | COMMUNITY

## 2024-06-06 RX ORDER — EMPAGLIFLOZIN 25 MG/1
1 TABLET TABLET, FILM COATED ORAL ONCE A DAY
Status: ON HOLD | COMMUNITY

## 2024-06-06 RX ORDER — VITAMIN E (DL,TOCOPHERYL ACET) 180 MG
1 CAPSULE CAPSULE ORAL ONCE A DAY
Status: ACTIVE | COMMUNITY

## 2024-06-06 RX ORDER — FAMOTIDINE 40 MG/1
1 TABLET AT BEDTIME TABLET, FILM COATED ORAL ONCE A DAY
Qty: 30 | Refills: 5 | OUTPATIENT
Start: 2024-06-06

## 2024-06-06 RX ORDER — ASCORBIC ACID 1000 MG
AS DIRECTED TABLET ORAL
Status: ON HOLD | COMMUNITY

## 2024-06-06 RX ORDER — DULAGLUTIDE 0.75 MG/.5ML
AS DIRECTED INJECTION, SOLUTION SUBCUTANEOUS
Status: ON HOLD | COMMUNITY

## 2024-06-06 RX ORDER — SACCHAROMYCES BOULARDII 250 MG
TAKE 1 CAPSULE DAILY CAPSULE ORAL
Refills: 0 | Status: ON HOLD | COMMUNITY

## 2024-06-06 RX ORDER — LORATADINE 10 MG/1
1 TABLET TABLET ORAL ONCE A DAY
Status: ACTIVE | COMMUNITY

## 2024-06-06 RX ORDER — DENOSUMAB 60 MG/ML
AS DIRECTED INJECTION SUBCUTANEOUS
Status: ACTIVE | COMMUNITY

## 2024-06-06 RX ORDER — SACCHAROMYCES BOULARDII 250 MG
AS DIRECTED CAPSULE ORAL
Status: ACTIVE | COMMUNITY

## 2024-06-06 RX ORDER — ONDANSETRON 4 MG/1
1 TABLET TABLET, ORALLY DISINTEGRATING ORAL
Qty: 6 | Refills: 0 | Status: ACTIVE | COMMUNITY
Start: 2023-09-07

## 2024-06-06 RX ORDER — MELOXICAM 7.5 MG
1 TABLET TABLET ORAL ONCE A DAY
Status: ON HOLD | COMMUNITY

## 2024-06-06 RX ORDER — LEVOTHYROXINE SODIUM 0.1 MG/1
TABLET ORAL
Qty: 90 | Refills: 0 | Status: ON HOLD | COMMUNITY
Start: 2020-01-27

## 2024-06-06 RX ORDER — MONTELUKAST SODIUM 10 MG/1
TAKE 1 TABLET DAILY TABLET, FILM COATED ORAL
Refills: 0 | Status: ACTIVE | COMMUNITY

## 2024-06-06 RX ORDER — VIBEGRON 75 MG/1
1 TABLET TABLET, FILM COATED ORAL ONCE A DAY
Status: ACTIVE | COMMUNITY

## 2024-06-06 RX ORDER — EZETIMIBE 10 MG/1
TAKE 1 TABLET DAILY TABLET ORAL
Refills: 0 | Status: ACTIVE | COMMUNITY

## 2024-06-06 RX ORDER — EMPAGLIFLOZIN 10 MG/1
TAKE 1 TABLET BY MOUTH ONCE DAILY TABLET, FILM COATED ORAL
Refills: 0 | Status: ACTIVE | COMMUNITY

## 2024-06-06 RX ORDER — LOSARTAN POTASSIUM 50 MG/1
TAKE 1 TABLET DAILY TABLET, FILM COATED ORAL
Refills: 0 | Status: ACTIVE | COMMUNITY

## 2024-06-06 RX ORDER — ORAL SEMAGLUTIDE 7 MG/1
1 TABLET AT LEAST 30 MINUTES BEFORE FIRST FOOD, BEVERAGE OR OTHER ORAL MEDICINE OF THE DAY TABLET ORAL ONCE A DAY
Status: ACTIVE | COMMUNITY

## 2024-06-06 RX ORDER — OMEPRAZOLE 40 MG/1
TAKE ONE CAPSULE BY MOUTH EVERY DAY CAPSULE, DELAYED RELEASE ORAL
OUTPATIENT
Start: 2020-07-27

## 2024-06-06 RX ORDER — PANTOPRAZOLE SODIUM 40 MG/1
1 TABLET TABLET, DELAYED RELEASE ORAL ONCE A DAY
Qty: 30 | Refills: 5 | OUTPATIENT
Start: 2024-06-06

## 2024-06-06 RX ORDER — THIAMINE MONONITRATE (VIT B1) 100 MG
1 TABLET TABLET ORAL ONCE A DAY
Status: ACTIVE | COMMUNITY

## 2024-06-06 RX ORDER — OMEPRAZOLE 40 MG/1
TAKE ONE CAPSULE BY MOUTH EVERY DAY CAPSULE, DELAYED RELEASE ORAL
Qty: 90 | Refills: 3 | Status: ACTIVE | COMMUNITY
Start: 2020-07-27

## 2024-06-06 RX ORDER — OMEGA-3/DHA/EPA/FISH OIL 300-1000MG
TAKE 1 TABLET DAILY CAPSULE ORAL
Refills: 0 | Status: ON HOLD | COMMUNITY

## 2024-06-06 RX ORDER — ANTIARTHRITIC COMBINATION NO.2 900 MG
1 CAPSULE TABLET ORAL ONCE A DAY
Status: ACTIVE | COMMUNITY

## 2024-06-06 NOTE — HPI-OTHER HISTORIES
EGD 8/17/2020 revealed irregular Z-line lower third of esophagus, small hiatal hernia.  Chronic gastritis.  Normal duodenum.  GE junction biopsy revealed reflux type changes no Taylor's.  Antrum biopsy revealed chemical/reactive gastropathy no H. pylori or IM. Colonoscopy 4/3/2024 revealed diverticulosis in sigmoid, descending and transverse colon otherwise normal.  CT abdomen and pelvis with IV contrast 3/4/2024.  Cholecystectomy.  Small hiatal hernia.  Duodenal diverticulum.  Large volume feces in the colon.  Labs 3/14/2024.  CBC normal with Hgb 12.7.  CMP: Alk phos 136, glucose 159.  Hemoglobin A1c 7.8.  TSH normal.

## 2024-06-06 NOTE — HPI-TODAY'S VISIT:
This is an 83-year-old female with a history of GERD, irregular bowel habits associated with a functional bowel disorder, paternal history of colon cancer (age 62) due screening in October 2022, fecal incontinence, and rectal pain presenting for GERD.  Last seen 9/7/2023 for GERD, chronic constipation history of colon cancer-father.  GERD controlled with omeprazole 40 mg daily.  Advised to consider restarting daily fiber for chronic constipation.  She is due for a screening colonoscopy.  Was in the ER 3/4/2024 with abdominal pain and chronic constipation. CT revealed large volume feces. She was discharged on docusate sodium twice a day.  She reports bad acid reflux and belching for the past 5 days.  Started Rybelsus early this month.  No NSAIDs. Has been on omeprazole for years.  No dysphagia, nausea or vomiting.  She denies constipation reports her bowels are moving regularly.  Has 1-2 bowel movements per day. No melena or hematochezia.  Last bowel movement was today was formed and brown.   No CP, SOB, oxygen use, palpitations, syncope, near-syncope. Denies blood thinners.  She does report with her last EGD she had an asthma attack after her procedure but did well with her recent colonoscopy. Weight is down 6 pounds from her appointment in September.

## 2024-08-02 ENCOUNTER — CLAIMS CREATED FROM THE CLAIM WINDOW (OUTPATIENT)
Dept: URBAN - METROPOLITAN AREA SURGERY CENTER 25 | Facility: SURGERY CENTER | Age: 84
End: 2024-08-02
Payer: MEDICARE

## 2024-08-02 ENCOUNTER — CLAIMS CREATED FROM THE CLAIM WINDOW (OUTPATIENT)
Dept: URBAN - METROPOLITAN AREA CLINIC 4 | Facility: CLINIC | Age: 84
End: 2024-08-02
Payer: MEDICARE

## 2024-08-02 DIAGNOSIS — K31.89 OTHER DISEASES OF STOMACH AND DUODENUM: ICD-10-CM

## 2024-08-02 DIAGNOSIS — K22.9 IRREGULAR Z LINE OF ESOPHAGUS: ICD-10-CM

## 2024-08-02 DIAGNOSIS — K29.70 GASTRITIS, UNSPECIFIED, WITHOUT BLEEDING: ICD-10-CM

## 2024-08-02 DIAGNOSIS — K21.9 GASTRO-ESOPHAGEAL REFLUX DISEASE WITHOUT ESOPHAGITIS: ICD-10-CM

## 2024-08-02 DIAGNOSIS — K44.9 HIATAL HERNIA: ICD-10-CM

## 2024-08-02 DIAGNOSIS — K22.89 OTHER SPECIFIED DISEASE OF ESOPHAGUS: ICD-10-CM

## 2024-08-02 DIAGNOSIS — R10.13 EPIGASTRIC PAIN: ICD-10-CM

## 2024-08-02 DIAGNOSIS — K29.60 OTHER GASTRITIS WITHOUT BLEEDING: ICD-10-CM

## 2024-08-02 DIAGNOSIS — K31.89 GASTRIC FOVEOLAR HYPERPLASIA: ICD-10-CM

## 2024-08-02 PROCEDURE — 00731 ANES UPR GI NDSC PX NOS: CPT | Performed by: ANESTHESIOLOGY

## 2024-08-02 PROCEDURE — 00731 ANES UPR GI NDSC PX NOS: CPT | Performed by: PHYSICIAN ASSISTANT

## 2024-08-02 PROCEDURE — 43239 EGD BIOPSY SINGLE/MULTIPLE: CPT | Performed by: INTERNAL MEDICINE

## 2024-08-02 PROCEDURE — 88342 IMHCHEM/IMCYTCHM 1ST ANTB: CPT | Performed by: PATHOLOGY

## 2024-08-02 PROCEDURE — 43239 EGD BIOPSY SINGLE/MULTIPLE: CPT | Performed by: CLINIC/CENTER

## 2024-08-02 PROCEDURE — 88305 TISSUE EXAM BY PATHOLOGIST: CPT | Performed by: PATHOLOGY

## 2024-08-02 RX ORDER — MONTELUKAST SODIUM 10 MG/1
TAKE 1 TABLET DAILY TABLET, FILM COATED ORAL
Refills: 0 | Status: ACTIVE | COMMUNITY

## 2024-08-02 RX ORDER — VITAMIN E (DL,TOCOPHERYL ACET) 180 MG
1 CAPSULE CAPSULE ORAL ONCE A DAY
Status: ACTIVE | COMMUNITY

## 2024-08-02 RX ORDER — SACCHAROMYCES BOULARDII 250 MG
TAKE 1 CAPSULE DAILY CAPSULE ORAL
Refills: 0 | Status: ON HOLD | COMMUNITY

## 2024-08-02 RX ORDER — VIBEGRON 75 MG/1
1 TABLET TABLET, FILM COATED ORAL ONCE A DAY
Status: ACTIVE | COMMUNITY

## 2024-08-02 RX ORDER — EZETIMIBE 10 MG/1
TAKE 1 TABLET DAILY TABLET ORAL
Refills: 0 | Status: ACTIVE | COMMUNITY

## 2024-08-02 RX ORDER — FAMOTIDINE 40 MG/1
1 TABLET AT BEDTIME TABLET, FILM COATED ORAL ONCE A DAY
Qty: 30 | Refills: 5 | Status: ACTIVE | COMMUNITY
Start: 2024-06-06

## 2024-08-02 RX ORDER — DULAGLUTIDE 0.75 MG/.5ML
AS DIRECTED INJECTION, SOLUTION SUBCUTANEOUS
Status: ON HOLD | COMMUNITY

## 2024-08-02 RX ORDER — EMPAGLIFLOZIN 25 MG/1
1 TABLET TABLET, FILM COATED ORAL ONCE A DAY
Status: ON HOLD | COMMUNITY

## 2024-08-02 RX ORDER — PANTOPRAZOLE SODIUM 40 MG/1
1 TABLET TABLET, DELAYED RELEASE ORAL ONCE A DAY
Qty: 30 | Refills: 5 | Status: ACTIVE | COMMUNITY
Start: 2024-06-06

## 2024-08-02 RX ORDER — LORATADINE 10 MG
1 PACKET MIXED WITH 8 OUNCES OF FLUID TABLET,DISINTEGRATING ORAL ONCE A DAY
Status: ON HOLD | COMMUNITY

## 2024-08-02 RX ORDER — ANTIARTHRITIC COMBINATION NO.2 900 MG
1 CAPSULE TABLET ORAL ONCE A DAY
Status: ACTIVE | COMMUNITY

## 2024-08-02 RX ORDER — ASCORBIC ACID 1000 MG
AS DIRECTED TABLET ORAL
Status: ON HOLD | COMMUNITY

## 2024-08-02 RX ORDER — DENOSUMAB 60 MG/ML
AS DIRECTED INJECTION SUBCUTANEOUS
Status: ACTIVE | COMMUNITY

## 2024-08-02 RX ORDER — AMLODIPINE BESYLATE 5 MG/1
1/2 TABLETS TABLET ORAL ONCE A DAY
Status: ON HOLD | COMMUNITY

## 2024-08-02 RX ORDER — MELOXICAM 7.5 MG
1 TABLET TABLET ORAL ONCE A DAY
Status: ON HOLD | COMMUNITY

## 2024-08-02 RX ORDER — SACCHAROMYCES BOULARDII 250 MG
AS DIRECTED CAPSULE ORAL
Status: ACTIVE | COMMUNITY

## 2024-08-02 RX ORDER — LOSARTAN POTASSIUM 50 MG/1
TAKE 1 TABLET DAILY TABLET, FILM COATED ORAL
Refills: 0 | Status: ACTIVE | COMMUNITY

## 2024-08-02 RX ORDER — ORAL SEMAGLUTIDE 7 MG/1
1 TABLET AT LEAST 30 MINUTES BEFORE FIRST FOOD, BEVERAGE OR OTHER ORAL MEDICINE OF THE DAY TABLET ORAL ONCE A DAY
Status: ACTIVE | COMMUNITY

## 2024-08-02 RX ORDER — THIAMINE MONONITRATE (VIT B1) 100 MG
1 TABLET TABLET ORAL ONCE A DAY
Status: ACTIVE | COMMUNITY

## 2024-08-02 RX ORDER — LEVOTHYROXINE SODIUM 0.1 MG/1
TABLET ORAL
Qty: 90 | Refills: 0 | Status: ON HOLD | COMMUNITY
Start: 2020-01-27

## 2024-08-02 RX ORDER — LORATADINE 10 MG/1
1 TABLET TABLET ORAL ONCE A DAY
Status: ACTIVE | COMMUNITY

## 2024-08-02 RX ORDER — ONDANSETRON 4 MG/1
1 TABLET TABLET, ORALLY DISINTEGRATING ORAL
Qty: 6 | Refills: 0 | Status: ACTIVE | COMMUNITY
Start: 2023-09-07

## 2024-08-02 RX ORDER — EMPAGLIFLOZIN 10 MG/1
TAKE 1 TABLET BY MOUTH ONCE DAILY TABLET, FILM COATED ORAL
Refills: 0 | Status: ACTIVE | COMMUNITY

## 2024-08-02 RX ORDER — OMEGA-3/DHA/EPA/FISH OIL 300-1000MG
TAKE 1 TABLET DAILY CAPSULE ORAL
Refills: 0 | Status: ON HOLD | COMMUNITY

## 2024-08-29 ENCOUNTER — OFFICE VISIT (OUTPATIENT)
Dept: URBAN - METROPOLITAN AREA CLINIC 107 | Facility: CLINIC | Age: 84
End: 2024-08-29

## 2024-08-29 VITALS
DIASTOLIC BLOOD PRESSURE: 84 MMHG | WEIGHT: 146.8 LBS | OXYGEN SATURATION: 96 % | RESPIRATION RATE: 18 BRPM | SYSTOLIC BLOOD PRESSURE: 130 MMHG | BODY MASS INDEX: 27.72 KG/M2 | HEIGHT: 61 IN | HEART RATE: 82 BPM | TEMPERATURE: 97 F

## 2024-08-29 RX ORDER — LEVOTHYROXINE SODIUM 0.1 MG/1
TABLET ORAL
Qty: 90 | Refills: 0 | Status: DISCONTINUED | COMMUNITY
Start: 2020-01-27

## 2024-08-29 RX ORDER — ORAL SEMAGLUTIDE 7 MG/1
1 TABLET AT LEAST 30 MINUTES BEFORE FIRST FOOD, BEVERAGE OR OTHER ORAL MEDICINE OF THE DAY TABLET ORAL ONCE A DAY
Status: ACTIVE | COMMUNITY

## 2024-08-29 RX ORDER — AMLODIPINE BESYLATE 5 MG/1
1/2 TABLETS TABLET ORAL ONCE A DAY
Status: DISCONTINUED | COMMUNITY

## 2024-08-29 RX ORDER — BUDESONIDE AND FORMOTEROL FUMARATE 160; 4.5 UG/1; UG/1
1 PUFF AS NEEDED AEROSOL, METERED RESPIRATORY (INHALATION)
Status: ACTIVE | COMMUNITY

## 2024-08-29 RX ORDER — EMPAGLIFLOZIN 25 MG/1
1 TABLET TABLET, FILM COATED ORAL ONCE A DAY
Refills: 0 | Status: ACTIVE | COMMUNITY

## 2024-08-29 RX ORDER — OMEGA-3/DHA/EPA/FISH OIL 300-1000MG
TAKE 1 TABLET DAILY CAPSULE ORAL
Refills: 0 | Status: DISCONTINUED | COMMUNITY

## 2024-08-29 RX ORDER — PANTOPRAZOLE SODIUM 40 MG/1
1 TABLET 30 MINUTES BEFORE BREAKFAST ON AN EMPTY STOMACH TABLET, DELAYED RELEASE ORAL ONCE A DAY
Qty: 90 TABLET | Refills: 1 | OUTPATIENT

## 2024-08-29 RX ORDER — ANTIARTHRITIC COMBINATION NO.2 900 MG
1 CAPSULE TABLET ORAL ONCE A DAY
Status: ACTIVE | COMMUNITY

## 2024-08-29 RX ORDER — LORATADINE 10 MG/1
1 TABLET TABLET ORAL ONCE A DAY
Status: DISCONTINUED | COMMUNITY

## 2024-08-29 RX ORDER — EMPAGLIFLOZIN 25 MG/1
1 TABLET TABLET, FILM COATED ORAL ONCE A DAY
Status: DISCONTINUED | COMMUNITY

## 2024-08-29 RX ORDER — LEVOTHYROXINE SODIUM 88 UG/1
1 CAPSULE IN THE MORNING ON AN EMPTY STOMACH CAPSULE ORAL ONCE A DAY
Status: ACTIVE | COMMUNITY

## 2024-08-29 RX ORDER — VITAMIN E (DL,TOCOPHERYL ACET) 180 MG
1 CAPSULE CAPSULE ORAL ONCE A DAY
Status: ACTIVE | COMMUNITY

## 2024-08-29 RX ORDER — PANTOPRAZOLE SODIUM 40 MG/1
1 TABLET TABLET, DELAYED RELEASE ORAL ONCE A DAY
Qty: 30 | Refills: 5 | Status: ACTIVE | COMMUNITY
Start: 2024-06-06

## 2024-08-29 RX ORDER — LORATADINE 10 MG
1 TABLET TABLET ORAL ONCE A DAY
Status: ACTIVE | COMMUNITY

## 2024-08-29 RX ORDER — LOSARTAN POTASSIUM 50 MG/1
1 TABLET TABLET, FILM COATED ORAL ONCE A DAY
Status: ACTIVE | COMMUNITY

## 2024-08-29 RX ORDER — SACCHAROMYCES BOULARDII 250 MG
TAKE 1 CAPSULE DAILY CAPSULE ORAL
Refills: 0 | Status: DISCONTINUED | COMMUNITY

## 2024-08-29 RX ORDER — MONTELUKAST 10 MG/1
1 TABLET TABLET, FILM COATED ORAL ONCE A DAY
Status: ACTIVE | COMMUNITY

## 2024-08-29 RX ORDER — EZETIMIBE 10 MG/1
TAKE 1 TABLET DAILY TABLET ORAL
Refills: 0 | Status: ACTIVE | COMMUNITY

## 2024-08-29 RX ORDER — FAMOTIDINE 40 MG/1
1 TABLET AT BEDTIME TABLET, FILM COATED ORAL ONCE A DAY
Qty: 30 | Refills: 5 | Status: ACTIVE | COMMUNITY
Start: 2024-06-06

## 2024-08-29 RX ORDER — FAMOTIDINE 40 MG/1
1 TABLET AT BEDTIME TABLET, FILM COATED ORAL ONCE A DAY
Qty: 90 TABLET | Refills: 1 | OUTPATIENT

## 2024-08-29 RX ORDER — THIAMINE MONONITRATE (VIT B1) 100 MG
1 TABLET TABLET ORAL ONCE A DAY
Status: DISCONTINUED | COMMUNITY

## 2024-08-29 RX ORDER — DENOSUMAB 60 MG/ML
AS DIRECTED INJECTION SUBCUTANEOUS
Status: ACTIVE | COMMUNITY

## 2024-08-29 RX ORDER — MELOXICAM 7.5 MG
1 TABLET TABLET ORAL ONCE A DAY
Status: DISCONTINUED | COMMUNITY

## 2024-08-29 RX ORDER — LOSARTAN POTASSIUM 50 MG/1
TAKE 1 TABLET DAILY TABLET, FILM COATED ORAL
Refills: 0 | Status: DISCONTINUED | COMMUNITY

## 2024-08-29 RX ORDER — VIBEGRON 75 MG/1
1 TABLET TABLET, FILM COATED ORAL ONCE A DAY
Status: ACTIVE | COMMUNITY

## 2024-08-29 RX ORDER — ONDANSETRON 4 MG/1
1 TABLET TABLET, ORALLY DISINTEGRATING ORAL
Qty: 6 | Refills: 0 | Status: ACTIVE | COMMUNITY
Start: 2023-09-07

## 2024-08-29 RX ORDER — LORATADINE 10 MG
1 PACKET MIXED WITH 8 OUNCES OF FLUID TABLET,DISINTEGRATING ORAL ONCE A DAY
Status: DISCONTINUED | COMMUNITY

## 2024-08-29 RX ORDER — SACCHAROMYCES BOULARDII 250 MG
AS DIRECTED CAPSULE ORAL
Status: ACTIVE | COMMUNITY

## 2024-08-29 RX ORDER — DULAGLUTIDE 0.75 MG/.5ML
AS DIRECTED INJECTION, SOLUTION SUBCUTANEOUS
Status: DISCONTINUED | COMMUNITY

## 2024-08-29 RX ORDER — MONTELUKAST SODIUM 10 MG/1
TAKE 1 TABLET DAILY TABLET, FILM COATED ORAL
Refills: 0 | Status: DISCONTINUED | COMMUNITY

## 2024-08-29 RX ORDER — ASCORBIC ACID 1000 MG
AS DIRECTED TABLET ORAL
Status: DISCONTINUED | COMMUNITY

## 2024-08-29 NOTE — HPI-TODAY'S VISIT:
This is an 83-year-old female with a history of GERD, irregular bowel habits associated with a functional bowel disorder, paternal history of colon cancer (age 62) due screening in October 2022, fecal incontinence, and rectal pain presenting for EGD follow-up.  Last seen 9/7/2023 for GERD, chronic constipation history of colon cancer-father.  GERD controlled with omeprazole 40 mg daily.  Advised to consider restarting daily fiber for chronic constipation.  She is due for a screening colonoscopy.  Was in the ER 3/4/2024 with abdominal pain and chronic constipation. CT revealed large volume feces. She was discharged on docusate sodium twice a day   Last seen 6/6/2024 for severe GERD, hiatal hernia, chronic constipation family history of colon cancer.  It was felt her Rybelsus she recently started was likely contributing.  Omeprazole was switched to pantoprazole and famotidine ordered at bedtime.  Recommend EGD for further evaluation.  Constipation at goal.  No further screening colonoscopies recommended due to age.  Last note:  She reports bad acid reflux and belching for the past 5 days. Started Rybelsus early this month. No NSAIDs. Has been on omeprazole for years. No dysphagia, nausea or vomiting. She denies constipation reports her bowels are moving regularly. Has 1-2 bowel movements per day. No melena or hematochezia. Last bowel movement was today was formed and brown. No CP, SOB, oxygen use, palpitations, syncope, near-syncope. Denies blood thinners.  She does report with her last EGD she had an asthma attack after her procedure but did well with her recent colonoscopy. Weight is down 6 pounds from her appointment in September.   Interval follow-up 8/29/2024 EGD 8/2/2024 revealed irregular Z-line at GE junction, medium size hiatal hernia.  A single mucosal papule found in the stomach which was biopsied.  Nonerosive gastritis normal duodenum. Distal esophageal biopsy revealed chronic inflammation with colon or mucosa no IM or dysplasia.  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 pylori or IM.  Antral biopsy revealed chronic gastritis.  She reports symptoms are unchanged.  Still has a lot of belching.  She would like a 90-day supply of her meds versus 30.  She did well with anesthesia with her recent EGD and did not have an asthma attack.  She states she used her inhaler before her procedure. Weight is down 6 pounds from her last appointmen

## 2025-02-26 ENCOUNTER — OFFICE VISIT (OUTPATIENT)
Dept: URBAN - METROPOLITAN AREA CLINIC 107 | Facility: CLINIC | Age: 85
End: 2025-02-26

## 2025-02-28 ENCOUNTER — OFFICE VISIT (OUTPATIENT)
Dept: URBAN - METROPOLITAN AREA CLINIC 107 | Facility: CLINIC | Age: 85
End: 2025-02-28

## 2025-03-11 ENCOUNTER — OFFICE VISIT (OUTPATIENT)
Dept: URBAN - METROPOLITAN AREA CLINIC 107 | Facility: CLINIC | Age: 85
End: 2025-03-11
Payer: MEDICARE

## 2025-03-11 VITALS
TEMPERATURE: 97.6 F | DIASTOLIC BLOOD PRESSURE: 86 MMHG | SYSTOLIC BLOOD PRESSURE: 148 MMHG | BODY MASS INDEX: 24.55 KG/M2 | OXYGEN SATURATION: 96 % | WEIGHT: 130 LBS | RESPIRATION RATE: 18 BRPM | HEART RATE: 76 BPM | HEIGHT: 61 IN

## 2025-03-11 DIAGNOSIS — K21.00 GASTROESOPHAGEAL REFLUX DISEASE WITH ESOPHAGITIS WITHOUT HEMORRHAGE: ICD-10-CM

## 2025-03-11 DIAGNOSIS — Z80.0 FAMILY HISTORY OF COLON CANCER IN FATHER: ICD-10-CM

## 2025-03-11 DIAGNOSIS — K44.9 HIATAL HERNIA: ICD-10-CM

## 2025-03-11 DIAGNOSIS — K59.09 CHRONIC CONSTIPATION: ICD-10-CM

## 2025-03-11 PROCEDURE — 99214 OFFICE O/P EST MOD 30 MIN: CPT | Performed by: NURSE PRACTITIONER

## 2025-03-11 RX ORDER — ORAL SEMAGLUTIDE 7 MG/1
1 TABLET AT LEAST 30 MINUTES BEFORE FIRST FOOD, BEVERAGE OR OTHER ORAL MEDICINE OF THE DAY TABLET ORAL ONCE A DAY
Status: DISCONTINUED | COMMUNITY

## 2025-03-11 RX ORDER — LOSARTAN POTASSIUM 50 MG/1
1 TABLET TABLET, FILM COATED ORAL ONCE A DAY
Status: ACTIVE | COMMUNITY

## 2025-03-11 RX ORDER — AMLODIPINE BESYLATE 2.5 MG/1
1 TABLET TABLET ORAL ONCE A DAY
Status: ACTIVE | COMMUNITY

## 2025-03-11 RX ORDER — VITAMIN E (DL,TOCOPHERYL ACET) 180 MG
1 CAPSULE CAPSULE ORAL ONCE A DAY
Status: DISCONTINUED | COMMUNITY

## 2025-03-11 RX ORDER — PANTOPRAZOLE SODIUM 20 MG/1
1 TABLET 1/2 TO 1 HOUR BEFORE LUNCH TABLET, DELAYED RELEASE ORAL ONCE A DAY
Qty: 30 | Refills: 0 | OUTPATIENT

## 2025-03-11 RX ORDER — EMPAGLIFLOZIN 25 MG/1
1 TABLET TABLET, FILM COATED ORAL ONCE A DAY
Refills: 0 | Status: ACTIVE | COMMUNITY

## 2025-03-11 RX ORDER — LEVOTHYROXINE SODIUM 88 UG/1
1 CAPSULE IN THE MORNING ON AN EMPTY STOMACH CAPSULE ORAL ONCE A DAY
Status: ACTIVE | COMMUNITY

## 2025-03-11 RX ORDER — BUDESONIDE AND FORMOTEROL FUMARATE 160; 4.5 UG/1; UG/1
1 PUFF AS NEEDED AEROSOL, METERED RESPIRATORY (INHALATION)
Status: ACTIVE | COMMUNITY

## 2025-03-11 RX ORDER — ANTIARTHRITIC COMBINATION NO.2 900 MG
1 CAPSULE TABLET ORAL ONCE A DAY
Status: ACTIVE | COMMUNITY

## 2025-03-11 RX ORDER — MONTELUKAST 10 MG/1
1 TABLET TABLET, FILM COATED ORAL ONCE A DAY
Status: ACTIVE | COMMUNITY

## 2025-03-11 RX ORDER — ONDANSETRON 4 MG/1
1 TABLET TABLET, ORALLY DISINTEGRATING ORAL
Qty: 6 | Refills: 0 | Status: ACTIVE | COMMUNITY
Start: 2023-09-07

## 2025-03-11 RX ORDER — DENOSUMAB 60 MG/ML
AS DIRECTED INJECTION SUBCUTANEOUS
Status: ACTIVE | COMMUNITY

## 2025-03-11 RX ORDER — VIBEGRON 75 MG/1
1 TABLET TABLET, FILM COATED ORAL ONCE A DAY
Status: ACTIVE | COMMUNITY

## 2025-03-11 RX ORDER — SACCHAROMYCES BOULARDII 250 MG
AS DIRECTED CAPSULE ORAL
Status: ACTIVE | COMMUNITY

## 2025-03-11 RX ORDER — PANTOPRAZOLE SODIUM 40 MG/1
1 TABLET 30 MINUTES BEFORE BREAKFAST ON AN EMPTY STOMACH TABLET, DELAYED RELEASE ORAL ONCE A DAY
Qty: 90 TABLET | Refills: 1 | Status: ACTIVE | COMMUNITY

## 2025-03-11 RX ORDER — LORATADINE 10 MG
1 TABLET TABLET ORAL ONCE A DAY
Status: ACTIVE | COMMUNITY

## 2025-03-11 RX ORDER — FAMOTIDINE 40 MG/1
1 TABLET AT BEDTIME TABLET, FILM COATED ORAL ONCE A DAY
Qty: 90 TABLET | Refills: 1 | Status: ACTIVE | COMMUNITY

## 2025-03-11 RX ORDER — EZETIMIBE 10 MG/1
TAKE 1 TABLET DAILY TABLET ORAL
Refills: 0 | Status: ACTIVE | COMMUNITY

## 2025-03-11 NOTE — HPI-TODAY'S VISIT:
This is an 83-year-old female with a history of GERD, irregular bowel habits associated with a functional bowel disorder, paternal history of colon cancer (age 62) due screening in October 2022, fecal incontinence, and rectal pain presenting for EGD follow-up.  Last seen 8/29/2024 for GERD, hiatal hernia. Started on pantoprazole and famotidine. Constipation at goal. Recently started on Rybelsus which was felt to be likely contributing to her symptoms of belching. Recommended FD guard.  Was in the ER 3/4/2024 with abdominal pain and chronic constipation. CT revealed large volume feces. She was discharged on docusate sodium twice a day  Interval follow-up 8/29/2024 EGD 8/2/2024 revealed irregular Z-line at GE junction, medium size hiatal hernia.  A single mucosal papule found in the stomach which was biopsied.  Nonerosive gastritis normal duodenum. Distal esophageal biopsy revealed chronic inflammation with colon or mucosa no IM or dysplasia.  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 pylori or IM.  Antral biopsy revealed chronic gastritis.  She reports symptoms are unchanged.  Still has a lot of belching.  She would like a 90-day supply of her meds versus 30.  She did well with anesthesia with her recent EGD and did not have an asthma attack.  She states she used her inhaler before her procedure. Started Rybelsus a couple of months ago. No NSAIDs. Has been on omeprazole for years. No dysphagia, nausea or vomiting. She denies constipation reports her bowels are moving regularly. Has 1-2 bowel movements per day. No melena or hematochezia. Last bowel movement was today was formed and brown. Weight is down 6 pounds from her last appointment  Interval history, 3/11/2025  Weight is down 16 pounds from her last appointment.  Was on Mounjaro, last dose was in January. Reflux was worse, had belching, nausea and vomiting.  Those symptoms have since resolved. Constipation improved with increased fiber in her diet.  GERD controlled with PPI and H2RA.  Osteoporosis on Prolia, last scan was 2024.